# Patient Record
Sex: FEMALE | Race: WHITE | Employment: OTHER | URBAN - METROPOLITAN AREA
[De-identification: names, ages, dates, MRNs, and addresses within clinical notes are randomized per-mention and may not be internally consistent; named-entity substitution may affect disease eponyms.]

---

## 2020-05-26 ENCOUNTER — DOCUMENTATION ONLY (OUTPATIENT)
Dept: NEPHROLOGY | Age: 75
End: 2020-05-26

## 2020-05-26 NOTE — PROGRESS NOTES
Tele clinic:    HPI:  Patient is a 66-year-old white female with history of hypertension, right-sided renal artery stenosis, coronary artery disease with advanced blockage recommended to get CABG, dyslipidemia who follows up in nephrology office.  Patient's creatinine has been in good range.  Her renal ultrasound showed her kidney sizes to be 8 to 9 cm last  year.  Her creatinine is 1.0, she has been recommended to get cardiac bypass surgery but patient is not for getting the surgery.  She denies any significant urinary symptoms.  She denies any shortness of breath but does have fatigue on minimal exertion.  Denies extremity edema.  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 Tetracycline *CHEMICALS*   Rash. HydrALAZINE HCl *ANTIHYPERTENSIVES*  [2018]: Allergies Reconciled      Family History Brenda Calvin LPN; 58/32/0004 4:22 AM)  Coronary Artery Disease   Father. Diabetes Mellitus Type II   Father. Congestive Heart Failure   Father. Social History Brenda Calvin LPN; 19/00/0219 2:01 AM)  Non Drinker/No Alcohol Use    No Drug Use    Tobacco use   Never smoker. Medication History Brenda Calvin LPN; 82/39/9808 0:45 AM)  Vitamin D (Ergocalciferol)  (97310IECI Capsule, 1 Oral ONCE EVERY OTHER WEEK, Taken starting 04/03/2019) Active. Benazepril HCl  (20MG Tablet, 1 (one) Oral daily, Taken starting 08/21/2019) Active.   Coreg  (12.5MG Tablet, 1 Oral two times daily, Taken starting 04/03/2019) Active. Trulicity  (0.08ZW/1.7HK Soln Pen-inj, Subcutaneous once a week) Active. MetFORMIN HCl  (1000MG Tablet, 2 Oral daily) Active. Levothyroxine Sodium  (75MCG Tablet, 1 Oral daily) Active. Lumigan  (0.01% Solution, 1 gtt each eye Ophthalmic daily) Active. Refresh Optive Advanced  (0.5-1-0.5% Solution, Ophthalmic as needed) Active. Dymista  (137-50MCG/ACT Suspension, 1 spray each nostril Nasal two times daily AS NEEDED) Active. Nasonex  (50MCG/ACT Suspension, 2 sprays in each nostril Nasal daily AS NEEDED) Active. Medications Reconciled     Past Surgical History Titi Townsend LPN; 67/10/4457 7:52 AM)  pacemaker  [2018]: Appendectomy    TONSILECTOMY AND ADENOIDECTOMY      Health Maintenance History Titi Townsend LPN; 27/09/4888 8:11 AM)  Flu Vaccine   Refused. NEVER  Pneumovax   Refused. NEVER  Colonoscopy, Screening   Refused. NEVER    Other Problems Titi Townsend LPN; 53/50/0683 6:12 AM)  Health education (Z71.9)          Review of Systems Akanksha Curiel MD; 11/26/2019 3:51 PM)  General Not Present- Fatigue and Fever. HEENT Not Present- Headache. Respiratory Not Present- Decreased Exercise Tolerance, Difficulty Breathing, Difficulty Breathing on Exertion and Dyspnea. Cardiovascular Not Present- Edema. Vitals Titi Albe LPN; 44/38/1894 0:37 PM)  11/26/2019 2:15 PM  Weight: 142 lb   Height: 63 in   Height was reported by patient.   Body Surface Area: 1.67 m²   Body Mass Index: 25.15 kg/m²    BP: 140/82 (Sitting, Left Arm, Standard)              Physical Exam Akanksha Curiel MD; 11/26/2019 3:51 PM)  The physical exam findings are as follows:  Note: General exam in no apparent distress  HEENT head normocephalic/atraumatic oronasal mucosa moist  neck supple no lymphadenopathy or thyromegaly  cardiovascular system S1-S2 normal no murmur rubs or gallops  abdomen soft nontender no organomegaly bowel sounds present  no bruit appreciated  extremities no edema        Assessment & Plan Awais Bustillo MD; 11/26/2019 3:52 PM)    Chronic kidney disease, stage 2 (mild) (N18.2)  Impression: Etiology of patient's chronic kidney disease is diabetic nephropathy. She does have microalbuminuria and is on Benzapril. Creatinine number is stable at 1.0. She is presently on 20 mg of benazepril. She does have right-sided renal artery stenosis, her blood pressure today in office is 140/70. I have I am recommending her to take blood pressures daily at home and log them Volume status looks okay. will need renal US on f/u for renal size , also urine prot/creat to f/u on proteinuria   right-sided renal artery stenosis,  Future Plans  11/1/2019: RENAL FUNCTION PANEL (66613) - one time  11/1/2019: [address] - one time  5/1/2020: RENAL FUNCTION PANEL (26692) - one time  Proteinuria (R80.9)  Impression: on ACE  recheck on next visit  diabetic control  Future Plans  5/1/2020: SPOT PROTEIN URINE (16441) - one time  5/1/2020: SPOT URINE CREATININE(63876) - one time  Hyperlipidemia, unspecified (E78.5)  Impression: PCP to monitor, pt not comfortable with statin. She is trying to control with diet. Current Plans  Patient was instructed to follow up with Primary Care Physician for management of lipids. Type 2 diabetes mellitus without complications (D40.9)  Current Plans  Patient was instructed to follow up with Primary Care Physician for management of diabetes. Portal Access Education (Z71.9)  Current Plans  Pt Education - How to [facility] using Patient Portal and 3rd Party Apps: discussed with patient and provided information.   Renal artery stenosis (I70.1)  Current Plans  RENAL ULTRASOUND POST VOID ([id number])  Signed electronically by Awais Bustillo MD (11/26/2019 3:53 PM)